# Patient Record
Sex: MALE | Race: WHITE | NOT HISPANIC OR LATINO
[De-identification: names, ages, dates, MRNs, and addresses within clinical notes are randomized per-mention and may not be internally consistent; named-entity substitution may affect disease eponyms.]

---

## 2022-10-10 PROBLEM — Z00.00 ENCOUNTER FOR PREVENTIVE HEALTH EXAMINATION: Status: ACTIVE | Noted: 2022-10-10

## 2022-10-18 ENCOUNTER — NON-APPOINTMENT (OUTPATIENT)
Age: 67
End: 2022-10-18

## 2022-10-18 ENCOUNTER — APPOINTMENT (OUTPATIENT)
Dept: NEPHROLOGY | Facility: CLINIC | Age: 67
End: 2022-10-18

## 2022-10-18 VITALS
DIASTOLIC BLOOD PRESSURE: 72 MMHG | HEART RATE: 80 BPM | HEIGHT: 73 IN | WEIGHT: 158 LBS | SYSTOLIC BLOOD PRESSURE: 114 MMHG | BODY MASS INDEX: 20.94 KG/M2

## 2022-10-18 DIAGNOSIS — Z86.711 PERSONAL HISTORY OF PULMONARY EMBOLISM: ICD-10-CM

## 2022-10-18 DIAGNOSIS — Z86.718 PERSONAL HISTORY OF OTHER VENOUS THROMBOSIS AND EMBOLISM: ICD-10-CM

## 2022-10-18 DIAGNOSIS — E78.00 PURE HYPERCHOLESTEROLEMIA, UNSPECIFIED: ICD-10-CM

## 2022-10-18 DIAGNOSIS — Z78.9 OTHER SPECIFIED HEALTH STATUS: ICD-10-CM

## 2022-10-18 PROCEDURE — 99205 OFFICE O/P NEW HI 60 MIN: CPT

## 2022-10-18 NOTE — REVIEW OF SYSTEMS
[Feeling Poorly] : feeling poorly [Recent Weight Loss (___ Lbs)] : recent [unfilled] ~Ulb weight loss [Negative] : Heme/Lymph [FreeTextEntry2] : 20 lbs - was 40 at 1 point in hosp

## 2022-10-18 NOTE — HISTORY OF PRESENT ILLNESS
[FreeTextEntry1] : 67-year-old man previously healthy, accompanied by his devoted wife, referred because of acute kidney injury and what may now be CKD 4.  He developed pemphigus vulgaris, and was treated with high-dose steroids and Rituxan for a severe flareup.  He developed pneumocystis pneumonia and was hospitalized in [place] at [facility] for 2 months from March to May.  Prednisone began at 40 mg for the first 2 weeks and then was tapered gradually -he is off it now, but will be starting Rituxan tomorrow.  His creatinine and GFR were perfectly normal in late 2021 and early 2022.  Creatinine was 0.8-0.9 with a GFR of at least 80.  He was nearly moribund during parts of his hospitalization and barely survived from what his wife describes.  He currently has a creatinine of 2.38, BUN 44, GFR 27, K4.5, CO2 22.  He also was severely anemic with a hemoglobin in the 6-7 range, and received 3 units of blood.  His hemoglobin came up to the range of 10-11 with a TF sat of 28% in July.  He received Bactrim for a lengthy period of time but not in the last 5 months.  So this elevation of creatinine is clearly not false due to tubular secretion of creatinine by Bactrim.  He admits to fatigue and coldness, which probably are a reflection of his anemia.

## 2022-10-18 NOTE — ASSESSMENT
[FreeTextEntry1] : 67-year-old man with pemphigus vulgaris, complicated by pneumocystis pneumonia and a 2-month hospitalization from March to May.  I believe that the deterioration in renal function was largely a form of JOEY related to the extreme illness, probable hypotension, severe anemia, and overwhelming infection.  The question now is whether there is reversibility.  I have ordered an ultrasound to assess kidney size, echogenicity, and rule out obstructive uropathy.  I ordered labs to include CMP, Cystatin C, CBC, PTH, phosphorus, urinalysis, U ACR, iron/TIBC C, ANCA, and screening for light chains.  There was no evidence of myeloma from his July blood work.  His dermatologic care will be handled by the [facility], Dr Wood et al. if his CO2 is below 23, I may start sodium bicarb and it to minimize the effects of metabolic acidosis.  If his hemoglobin is below 10.0, I anticipate starting erythropoietin in some form, as Retacrit or Procrit.  Although pemphigus is an autoimmune condition, I do not see any relationship directly and would not consider renal biopsy unless there is substantial proteinuria that is unexplained.  Time spent 55 minutes

## 2022-10-24 DIAGNOSIS — D50.9 IRON DEFICIENCY ANEMIA, UNSPECIFIED: ICD-10-CM

## 2022-12-05 ENCOUNTER — APPOINTMENT (OUTPATIENT)
Dept: NEPHROLOGY | Facility: CLINIC | Age: 67
End: 2022-12-05

## 2022-12-05 VITALS
HEIGHT: 73 IN | HEART RATE: 72 BPM | BODY MASS INDEX: 21.2 KG/M2 | SYSTOLIC BLOOD PRESSURE: 122 MMHG | WEIGHT: 160 LBS | DIASTOLIC BLOOD PRESSURE: 73 MMHG

## 2022-12-05 DIAGNOSIS — N18.4 CHRONIC KIDNEY DISEASE, STAGE 4 (SEVERE): ICD-10-CM

## 2022-12-05 PROCEDURE — 99214 OFFICE O/P EST MOD 30 MIN: CPT

## 2022-12-05 NOTE — ASSESSMENT
[FreeTextEntry1] : 68 yo M with good BP, +/- stable CKD, improved pemphigus control -  labs in 3-4 mos, then vis here

## 2022-12-05 NOTE — HISTORY OF PRESENT ILLNESS
[FreeTextEntry1] : 66 yo M with hx JOEY, CKD3-4, pemphigus managed by Dr Misty Mancia at [facility] - renal function stable - creat up from 2.07 to 2.28 but was on bactrim , which raises creatine artifactually by blocking tubular secretion of creat.  Pred down to 5 mg, done with Rituxan for now - appetite better, swalowing better, hgb up to 10.7 (saw Dr Baugh today - TF sat up to 22, ferritin pending.  GI w/u by Dr Niño was ok

## 2022-12-05 NOTE — CONSULT LETTER
[Dear  ___] : Dear  [unfilled], [Consult Letter:] : I had the pleasure of evaluating your patient, [unfilled]. [Please see my note below.] : Please see my note below. [Consult Closing:] : Thank you very much for allowing me to participate in the care of this patient.  If you have any questions, please do not hesitate to contact me. [Sincerely,] : Sincerely, [FreeTextEntry2] : Dr Rolo Muñoz [FreeTextEntry3] : Sincerely, \par \par Kwesi Huizar MD, FACP\par  [DrMusa  ___] : Dr. REN [DrMusa ___] : Dr. REN

## 2023-03-06 ENCOUNTER — APPOINTMENT (OUTPATIENT)
Dept: NEPHROLOGY | Facility: CLINIC | Age: 68
End: 2023-03-06
Payer: MEDICARE

## 2023-03-06 VITALS
HEIGHT: 73 IN | BODY MASS INDEX: 21.34 KG/M2 | DIASTOLIC BLOOD PRESSURE: 83 MMHG | HEART RATE: 76 BPM | SYSTOLIC BLOOD PRESSURE: 128 MMHG | WEIGHT: 161 LBS

## 2023-03-06 PROCEDURE — 99214 OFFICE O/P EST MOD 30 MIN: CPT

## 2023-03-06 NOTE — HISTORY OF PRESENT ILLNESS
[FreeTextEntry1] : 67-year-old man with a history of JOEY, CKD 3-4, pemphigus managed by Dr. Misty Mancia at [facility].  His renal function is stable, with a creatinine of 2.18, right in the middle of his usual comfort zone of 2.1-2.3.  The 2.3 occurred when he was on Bactrim, which blocks tubular secretion of creatinine and falsely raised serum creatinine.  His last Rituxan was 3 months ago.  His prednisone is down to 5 mg every other day and will be tapered off soon hopefully.  It is probably responsible for his BUN being somewhat elevated at 44.  He has a normal urine microalbumin.  His GFR is 32 by creatinine, but 45 by cys statin C.  The best news overall is the fact that his sense of wellbeing has returned beautifully, partly because his hemoglobin is up to 11.7 thanks to Dr. Baugh.   He has now been able to return to the active lifestyle he enjoyed before, and [facility] [employer] [facility] [place].  He and his wife have planned trips each of the next several months, [place] in May,, [place] in July.

## 2023-03-06 NOTE — CONSULT LETTER
[Dear  ___] : Dear  [unfilled], [Consult Letter:] : I had the pleasure of evaluating your patient, [unfilled]. [Please see my note below.] : Please see my note below. [Consult Closing:] : Thank you very much for allowing me to participate in the care of this patient.  If you have any questions, please do not hesitate to contact me. [Sincerely,] : Sincerely, [FreeTextEntry2] : Dr. Tiot Baugh -hematology [FreeTextEntry3] : Sincerely, \par \par Kwesi Huizar MD, FACP\par  [DrMusa  ___] : Dr. REN

## 2023-03-06 NOTE — ASSESSMENT
[FreeTextEntry1] : 67-year-old man with stable CKD 3B, now tapering off prednisone, with pemphigus well-controlled.  Hemoglobin is up to 11.9, no microalbuminuria, minimal secondary hyperparathyroidism, and GFR higher than we would have thought based on creatinine.  The best news is his return of wellbeing and quality of life.  He will return in August after he and his wife to several trips.  I have ordered labs to include BMP, Cystatin C, PTH, H&H.

## 2023-08-16 ENCOUNTER — APPOINTMENT (OUTPATIENT)
Dept: NEPHROLOGY | Facility: CLINIC | Age: 68
End: 2023-08-16
Payer: MEDICARE

## 2023-08-16 VITALS
BODY MASS INDEX: 21.2 KG/M2 | DIASTOLIC BLOOD PRESSURE: 74 MMHG | WEIGHT: 160 LBS | HEIGHT: 73 IN | SYSTOLIC BLOOD PRESSURE: 132 MMHG

## 2023-08-16 PROCEDURE — 99214 OFFICE O/P EST MOD 30 MIN: CPT

## 2023-08-16 NOTE — HISTORY OF PRESENT ILLNESS
[FreeTextEntry1] : 68-year-old man with a history of JOEY, CKD stage IIIb, pemphigus managed by Dr. Misty Mancia at the [facility].  His PCP is Dr. Rolo Muñoz.  Renal function has been stable with a creatinine of 2.2, but his latest is 1.91 with a BUN of 29, GFR of 37 by creatinine, but 59 by Cystatin C.  So his true GFR is probably at least 45-50.  His K is 4.6 with a CO2 of 25.  Hemoglobin is 12.1, PTH is normal at 48.  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  He feels generally well and remains optimistic with a positive demeanor.

## 2023-08-16 NOTE — CONSULT LETTER
[Dear  ___] : Dear  [unfilled], [Consult Letter:] : I had the pleasure of evaluating your patient, [unfilled]. [Please see my note below.] : Please see my note below. [Consult Closing:] : Thank you very much for allowing me to participate in the care of this patient.  If you have any questions, please do not hesitate to contact me. [Sincerely,] : Sincerely, [FreeTextEntry2] : Dr Rolo Muñoz [FreeTextEntry3] : Sincerely,   Kwesi Huizar MD, FACP [DrMusa  ___] : Dr. REN

## 2023-08-16 NOTE — ASSESSMENT
[FreeTextEntry1] : 68-year-old man with stable, slightly improved CKD -best news of all is that his creatinine dropped to 1.9 and his GFR is higher than his creatinine would suggest.  He is about to undergo hip replacement and will return here in 4 to 5 months, preceded by labs to include BMP, Cystatin C, PTH.

## 2023-12-13 ENCOUNTER — APPOINTMENT (OUTPATIENT)
Dept: NEPHROLOGY | Facility: CLINIC | Age: 68
End: 2023-12-13
Payer: MEDICARE

## 2023-12-13 VITALS
WEIGHT: 160 LBS | SYSTOLIC BLOOD PRESSURE: 133 MMHG | DIASTOLIC BLOOD PRESSURE: 75 MMHG | BODY MASS INDEX: 21.2 KG/M2 | HEIGHT: 73 IN

## 2023-12-13 PROCEDURE — 99214 OFFICE O/P EST MOD 30 MIN: CPT

## 2023-12-13 NOTE — HISTORY OF PRESENT ILLNESS
[FreeTextEntry1] : 68-year-old man referred by Dr. Rolo Muñoz because of renal disease.  He has pemphigus managed by Dr. Misty Mancia at [facility].  His renal function has actually improved, with creatinine falling from 2.2 down to 1.9 and now 1.62.  His GFR is 45 by creatinine but only 33 by Cystatin C.  His K is 4.3, CO2 25, PTH is normal at 28.  He just completed his last dose of rituximab, well-tolerated.  He feels generally well.  He exercises very faithfully virtually every day, walking 4 miles and weight lifting.  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

## 2023-12-13 NOTE — ASSESSMENT
[FreeTextEntry1] : 68-year-old man with CKD which has progressively improved in parallel with his rituximab treatments at [place].  It is more than tempting to assume that Rituxan is treating an immunologic aspect of his disease.  His BP remains good.  His exercise habits are superb.  He will return in 4 months, preceded by labs to include BMP, Cystatin C, urinalysis, UACR.

## 2024-04-15 ENCOUNTER — APPOINTMENT (OUTPATIENT)
Dept: NEPHROLOGY | Facility: CLINIC | Age: 69
End: 2024-04-15
Payer: MEDICARE

## 2024-04-15 VITALS
BODY MASS INDEX: 21.2 KG/M2 | HEIGHT: 73 IN | DIASTOLIC BLOOD PRESSURE: 74 MMHG | SYSTOLIC BLOOD PRESSURE: 122 MMHG | WEIGHT: 160 LBS

## 2024-04-15 DIAGNOSIS — N18.32 CHRONIC KIDNEY DISEASE, STAGE 3B: ICD-10-CM

## 2024-04-15 DIAGNOSIS — N18.9 CHRONIC KIDNEY DISEASE, UNSPECIFIED: ICD-10-CM

## 2024-04-15 DIAGNOSIS — N17.9 ACUTE KIDNEY FAILURE, UNSPECIFIED: ICD-10-CM

## 2024-04-15 DIAGNOSIS — D63.1 CHRONIC KIDNEY DISEASE, UNSPECIFIED: ICD-10-CM

## 2024-04-15 DIAGNOSIS — E87.20 ACIDOSIS, UNSPECIFIED: ICD-10-CM

## 2024-04-15 PROCEDURE — 99214 OFFICE O/P EST MOD 30 MIN: CPT

## 2024-04-15 PROCEDURE — G2211 COMPLEX E/M VISIT ADD ON: CPT

## 2024-04-15 NOTE — ASSESSMENT
[FreeTextEntry1] : 68-year-old man with stage IIIb CKD, which worsened slightly, probably as result of poor hydration and use of NSAIDs.  We discussed the need to hydrate much better and stop using Aleve and ibuprofen.  He promises to do both.  He will return in 4 months, preceded by labs to include UACR, BMP, Cystatin C, PTH.

## 2024-04-15 NOTE — HISTORY OF PRESENT ILLNESS
[FreeTextEntry1] : 68-year-old man referred by Dr. Rolo Muñoz because of renal disease.  His pemphigus has been managed at [facility] for years.  He receives rituximab, and I have reassured him that it is not nephrotoxic.  Renal function improved with creatinine falling from 2.2 down to a low of 1.6, but now back to 2.0 with a GFR of 35, BUN 33, normal urinalysis, K4.7, CO2 25, no microalbuminuria, and Cystatin C is pending.  In the past, his GFR has been lower using Cystatin C than serum creatinine.  It appears there are 2 possible reasons for the rising creatinine recently, 1 being lack of fluid intake, particularly when he hikes, and he has been using NSAIDs including Aleve and ibuprofen for hip discomfort.  He says he can get by without them and will stop.

## 2024-08-14 ENCOUNTER — APPOINTMENT (OUTPATIENT)
Dept: NEPHROLOGY | Facility: CLINIC | Age: 69
End: 2024-08-14
Payer: MEDICARE

## 2024-08-14 VITALS
HEIGHT: 73 IN | SYSTOLIC BLOOD PRESSURE: 118 MMHG | DIASTOLIC BLOOD PRESSURE: 75 MMHG | BODY MASS INDEX: 21.2 KG/M2 | WEIGHT: 160 LBS

## 2024-08-14 DIAGNOSIS — E87.20 ACIDOSIS, UNSPECIFIED: ICD-10-CM

## 2024-08-14 DIAGNOSIS — N18.32 CHRONIC KIDNEY DISEASE, STAGE 3B: ICD-10-CM

## 2024-08-14 DIAGNOSIS — E87.5 HYPERKALEMIA: ICD-10-CM

## 2024-08-14 DIAGNOSIS — N18.9 CHRONIC KIDNEY DISEASE, UNSPECIFIED: ICD-10-CM

## 2024-08-14 DIAGNOSIS — D63.1 CHRONIC KIDNEY DISEASE, UNSPECIFIED: ICD-10-CM

## 2024-08-14 DIAGNOSIS — N17.9 ACUTE KIDNEY FAILURE, UNSPECIFIED: ICD-10-CM

## 2024-08-14 PROCEDURE — G2211 COMPLEX E/M VISIT ADD ON: CPT

## 2024-08-14 PROCEDURE — 99214 OFFICE O/P EST MOD 30 MIN: CPT

## 2024-08-14 NOTE — HISTORY OF PRESENT ILLNESS
[FreeTextEntry1] : 69-year-old man referred by Dr. Rolo Muñoz because of renal disease.  He has pemphigus that has been managed at [facility] for years.  He receives rituximab.  Renal function improved from a peak creatinine of 2.2 down to a low of 1.6 and then seem to plateau at 2.0, with no microalbuminuria and a BUN typically in the 30s.  His fluid intake is probably less than ideal.  He also has used NSAIDs in the past because of hip pain.  He agreed to stop them when I saw him back in April.  His creatinine 6 weeks ago was back down to 1.66, but BUN was still elevated at 39, GFR 44 by creatinine but only 36 by Cystatin C, K was higher than usual at 5.4, CO2 22, urine microalbumin normal, PTH normal at 68.  He feels well generally, and was just walking 8 to 10 miles a day in [place] recently.  He is about to undergo left hip replacement on August 23 at [facility] in [facility].  He previously had the right hip replaced last year and did well.  He has avoided NSAIDs totally.

## 2024-08-14 NOTE — ASSESSMENT
[FreeTextEntry1] : 69-year-old man who is renal function is stable.  His last dose of rituximab was in December.  He is treated at [facility] by Dr. Dos Santos.  His BP remains normal.  He will return in 4 months, preceded by labs.

## 2024-12-18 ENCOUNTER — APPOINTMENT (OUTPATIENT)
Dept: NEPHROLOGY | Facility: CLINIC | Age: 69
End: 2024-12-18
Payer: MEDICARE

## 2024-12-18 VITALS
SYSTOLIC BLOOD PRESSURE: 112 MMHG | DIASTOLIC BLOOD PRESSURE: 74 MMHG | HEIGHT: 73 IN | WEIGHT: 160 LBS | BODY MASS INDEX: 21.2 KG/M2

## 2024-12-18 DIAGNOSIS — D63.1 CHRONIC KIDNEY DISEASE, UNSPECIFIED: ICD-10-CM

## 2024-12-18 DIAGNOSIS — E87.20 ACIDOSIS, UNSPECIFIED: ICD-10-CM

## 2024-12-18 DIAGNOSIS — N18.32 CHRONIC KIDNEY DISEASE, STAGE 3B: ICD-10-CM

## 2024-12-18 DIAGNOSIS — N17.9 ACUTE KIDNEY FAILURE, UNSPECIFIED: ICD-10-CM

## 2024-12-18 DIAGNOSIS — E87.5 HYPERKALEMIA: ICD-10-CM

## 2024-12-18 DIAGNOSIS — N18.9 CHRONIC KIDNEY DISEASE, UNSPECIFIED: ICD-10-CM

## 2024-12-18 PROCEDURE — G2211 COMPLEX E/M VISIT ADD ON: CPT

## 2024-12-18 PROCEDURE — 99214 OFFICE O/P EST MOD 30 MIN: CPT

## 2025-04-23 ENCOUNTER — APPOINTMENT (OUTPATIENT)
Dept: NEPHROLOGY | Facility: CLINIC | Age: 70
End: 2025-04-23
Payer: MEDICARE

## 2025-04-23 VITALS
HEIGHT: 73 IN | WEIGHT: 160 LBS | SYSTOLIC BLOOD PRESSURE: 104 MMHG | DIASTOLIC BLOOD PRESSURE: 66 MMHG | BODY MASS INDEX: 21.2 KG/M2

## 2025-04-23 DIAGNOSIS — N18.32 CHRONIC KIDNEY DISEASE, STAGE 3B: ICD-10-CM

## 2025-04-23 DIAGNOSIS — D63.1 CHRONIC KIDNEY DISEASE, UNSPECIFIED: ICD-10-CM

## 2025-04-23 DIAGNOSIS — N17.9 ACUTE KIDNEY FAILURE, UNSPECIFIED: ICD-10-CM

## 2025-04-23 DIAGNOSIS — N18.9 CHRONIC KIDNEY DISEASE, UNSPECIFIED: ICD-10-CM

## 2025-04-23 DIAGNOSIS — E87.20 ACIDOSIS, UNSPECIFIED: ICD-10-CM

## 2025-04-23 PROCEDURE — G2211 COMPLEX E/M VISIT ADD ON: CPT

## 2025-04-23 PROCEDURE — 99214 OFFICE O/P EST MOD 30 MIN: CPT

## 2025-05-02 ENCOUNTER — APPOINTMENT (OUTPATIENT)
Dept: PULMONOLOGY | Facility: CLINIC | Age: 70
End: 2025-05-02
Payer: MEDICARE

## 2025-05-02 ENCOUNTER — NON-APPOINTMENT (OUTPATIENT)
Age: 70
End: 2025-05-02

## 2025-05-02 VITALS
TEMPERATURE: 97.9 F | HEIGHT: 73 IN | DIASTOLIC BLOOD PRESSURE: 74 MMHG | HEART RATE: 55 BPM | BODY MASS INDEX: 22.53 KG/M2 | OXYGEN SATURATION: 100 % | WEIGHT: 170 LBS | SYSTOLIC BLOOD PRESSURE: 116 MMHG

## 2025-05-02 DIAGNOSIS — J18.9 PNEUMONIA, UNSPECIFIED ORGANISM: ICD-10-CM

## 2025-05-02 PROCEDURE — 94060 EVALUATION OF WHEEZING: CPT

## 2025-05-02 PROCEDURE — 99204 OFFICE O/P NEW MOD 45 MIN: CPT | Mod: 25

## 2025-05-02 PROCEDURE — 71046 X-RAY EXAM CHEST 2 VIEWS: CPT

## 2025-05-07 LAB
DEPRECATED KAPPA LC FREE/LAMBDA SER: 0.47 RATIO
IGA SER QL IEP: 229 MG/DL
IGG SER QL IEP: 893 MG/DL
IGM SER QL IEP: 51 MG/DL
KAPPA LC CSF-MCNC: 3.86 MG/DL
KAPPA LC SERPL-MCNC: 1.83 MG/DL

## 2025-05-10 LAB
DEPRECATED S PNEUM 1 IGG SER-MCNC: 0.3 MCG/ML
DEPRECATED S PNEUM12 AB SER-ACNC: 0.3 MCG/ML
DEPRECATED S PNEUM14 AB SER-ACNC: 13.9 MCG/ML
DEPRECATED S PNEUM17 IGG SER IA-MCNC: 0.5 MCG/ML
DEPRECATED S PNEUM18 IGG SER IA-MCNC: 0.6 MCG/ML
DEPRECATED S PNEUM19 IGG SER-MCNC: 0.6 MCG/ML
DEPRECATED S PNEUM19 IGG SER-MCNC: 0.7 MCG/ML
DEPRECATED S PNEUM2 IGG SER-MCNC: 0.3 MCG/ML
DEPRECATED S PNEUM20 IGG SER-MCNC: 1.4 MCG/ML
DEPRECATED S PNEUM22 IGG SER-MCNC: 0.2 MCG/ML
DEPRECATED S PNEUM23 AB SER-ACNC: 0.2 MCG/ML
DEPRECATED S PNEUM3 AB SER-ACNC: 0.1 MCG/ML
DEPRECATED S PNEUM34 IGG SER-MCNC: 0.7 MCG/ML
DEPRECATED S PNEUM4 AB SER-ACNC: <0.1 MCG/ML
DEPRECATED S PNEUM5 IGG SER-MCNC: <0.1 MCG/ML
DEPRECATED S PNEUM6 IGG SER-MCNC: 0.2 MCG/ML
DEPRECATED S PNEUM7 IGG SER-ACNC: 0.2 MCG/ML
DEPRECATED S PNEUM8 AB SER-ACNC: 6.9 MCG/ML
DEPRECATED S PNEUM9 AB SER-ACNC: 0.1 MCG/ML
DEPRECATED S PNEUM9 IGG SER-MCNC: 0.1 MCG/ML
IMMUNOLOGIST REVIEW: NORMAL
STREPTOCOCCUS PNEUMONIAE SEROTYPE 11A: 0.1 MCG/ML
STREPTOCOCCUS PNEUMONIAE SEROTYPE 15B: 1.2 MCG/ML
STREPTOCOCCUS PNEUMONIAE SEROTYPE 33F: 0.5 MCG/ML

## 2025-05-30 ENCOUNTER — APPOINTMENT (OUTPATIENT)
Dept: CT IMAGING | Facility: CLINIC | Age: 70
End: 2025-05-30

## 2025-05-30 PROCEDURE — 71250 CT THORAX DX C-: CPT

## 2025-06-05 LAB
DEPRECATED S PNEUM 1 IGG SER-MCNC: 0.4 MCG/ML
DEPRECATED S PNEUM12 AB SER-ACNC: 0.2 MCG/ML
DEPRECATED S PNEUM14 AB SER-ACNC: 62.3 MCG/ML
DEPRECATED S PNEUM17 IGG SER IA-MCNC: 0.6 MCG/ML
DEPRECATED S PNEUM18 IGG SER IA-MCNC: 1.8 MCG/ML
DEPRECATED S PNEUM19 IGG SER-MCNC: 0.5 MCG/ML
DEPRECATED S PNEUM19 IGG SER-MCNC: 0.7 MCG/ML
DEPRECATED S PNEUM2 IGG SER-MCNC: 0.4 MCG/ML
DEPRECATED S PNEUM20 IGG SER-MCNC: 2.1 MCG/ML
DEPRECATED S PNEUM22 IGG SER-MCNC: 0.2 MCG/ML
DEPRECATED S PNEUM23 AB SER-ACNC: 0.2 MCG/ML
DEPRECATED S PNEUM3 AB SER-ACNC: 0.1 MCG/ML
DEPRECATED S PNEUM34 IGG SER-MCNC: 1.3 MCG/ML
DEPRECATED S PNEUM4 AB SER-ACNC: <0.1 MCG/ML
DEPRECATED S PNEUM5 IGG SER-MCNC: <0.1 MCG/ML
DEPRECATED S PNEUM6 IGG SER-MCNC: 0.4 MCG/ML
DEPRECATED S PNEUM7 IGG SER-ACNC: 0.2 MCG/ML
DEPRECATED S PNEUM8 AB SER-ACNC: 10.3 MCG/ML
DEPRECATED S PNEUM9 AB SER-ACNC: 0.1 MCG/ML
DEPRECATED S PNEUM9 IGG SER-MCNC: 0.1 MCG/ML
IMMUNOLOGIST REVIEW: NORMAL
STREPTOCOCCUS PNEUMONIAE SEROTYPE 11A: 0.2 MCG/ML
STREPTOCOCCUS PNEUMONIAE SEROTYPE 15B: 3.2 MCG/ML
STREPTOCOCCUS PNEUMONIAE SEROTYPE 33F: 0.4 MCG/ML

## 2025-08-21 ENCOUNTER — APPOINTMENT (OUTPATIENT)
Dept: NEPHROLOGY | Facility: CLINIC | Age: 70
End: 2025-08-21
Payer: MEDICARE

## 2025-08-21 VITALS
HEIGHT: 73 IN | DIASTOLIC BLOOD PRESSURE: 77 MMHG | SYSTOLIC BLOOD PRESSURE: 116 MMHG | BODY MASS INDEX: 22.53 KG/M2 | WEIGHT: 170 LBS

## 2025-08-21 DIAGNOSIS — N18.32 CHRONIC KIDNEY DISEASE, STAGE 3B: ICD-10-CM

## 2025-08-21 DIAGNOSIS — N25.81 SECONDARY HYPERPARATHYROIDISM OF RENAL ORIGIN: ICD-10-CM

## 2025-08-21 DIAGNOSIS — E87.20 ACIDOSIS, UNSPECIFIED: ICD-10-CM

## 2025-08-21 DIAGNOSIS — D63.1 CHRONIC KIDNEY DISEASE, UNSPECIFIED: ICD-10-CM

## 2025-08-21 DIAGNOSIS — N18.9 CHRONIC KIDNEY DISEASE, UNSPECIFIED: ICD-10-CM

## 2025-08-21 DIAGNOSIS — N17.9 ACUTE KIDNEY FAILURE, UNSPECIFIED: ICD-10-CM

## 2025-08-21 PROCEDURE — G2211 COMPLEX E/M VISIT ADD ON: CPT

## 2025-08-21 PROCEDURE — 99214 OFFICE O/P EST MOD 30 MIN: CPT

## 2025-09-15 ENCOUNTER — APPOINTMENT (OUTPATIENT)
Dept: PULMONOLOGY | Facility: CLINIC | Age: 70
End: 2025-09-15
Payer: MEDICARE

## 2025-09-15 PROCEDURE — 36415 COLL VENOUS BLD VENIPUNCTURE: CPT

## 2025-09-22 LAB
CD19 CELLS # BLD: 35 CELLS/UL
CD19 CELLS NFR BLD: 2.4
CD19+CD27+ CELLS # BLD: 1 CELLS/UL
CD19+CD27+IGD+IGM+ CELLS # BLD: 1 CELLS/UL
CD19+CD27+IGD-IGM- CELLS # BLD: 1 CELLS/UL
CD19+CD38+IGM+ CELLS # BLD: 10 CELLS/UL
CD19+CD38+IGM- CELLS # BLD: 0 CELLS/UL
CD20 CELLS # BLD: 34 CELLS/UL
CD20 CELLS/CELLS.CD19 NFR BLD: 96.7
CD21LOW38- CELLS # BLD: 1 CELLS/UL
CD21LOW38- CELLS/CELLS.CD19 NFR BLD: 1.7
CD27 CELLS/CELLS.CD19 NFR BLD: 4
CD27+IGD+IGM+/CD19 CELLS NFR BLD: 1.6
CD27+IGD-IGM-/CD19 CELLS NFR BLD: 2.1
CD38+IGM+ CELLS/CELLS.CD19 NFR BLD: 29
CD38+IGM- CELLS/CELLS.CD19 NFR BLD: 1.3
DEPRECATED S PNEUM 1 IGG SER-MCNC: 0.5 MCG/ML
DEPRECATED S PNEUM12 AB SER-ACNC: 0.1 MCG/ML
DEPRECATED S PNEUM14 AB SER-ACNC: 83.5 MCG/ML
DEPRECATED S PNEUM17 IGG SER IA-MCNC: 0.5 MCG/ML
DEPRECATED S PNEUM18 IGG SER IA-MCNC: 2.3 MCG/ML
DEPRECATED S PNEUM19 IGG SER-MCNC: 0.3 MCG/ML
DEPRECATED S PNEUM19 IGG SER-MCNC: 1 MCG/ML
DEPRECATED S PNEUM2 IGG SER-MCNC: 0.4 MCG/ML
DEPRECATED S PNEUM20 IGG SER-MCNC: 2.3 MCG/ML
DEPRECATED S PNEUM22 IGG SER-MCNC: 0.2 MCG/ML
DEPRECATED S PNEUM23 AB SER-ACNC: 0.2 MCG/ML
DEPRECATED S PNEUM3 AB SER-ACNC: 0.1 MCG/ML
DEPRECATED S PNEUM34 IGG SER-MCNC: 1.6 MCG/ML
DEPRECATED S PNEUM4 AB SER-ACNC: 0.1 MCG/ML
DEPRECATED S PNEUM5 IGG SER-MCNC: <0.1 MCG/ML
DEPRECATED S PNEUM6 IGG SER-MCNC: 0.6 MCG/ML
DEPRECATED S PNEUM7 IGG SER-ACNC: 0.3 MCG/ML
DEPRECATED S PNEUM8 AB SER-ACNC: 9.7 MCG/ML
DEPRECATED S PNEUM9 AB SER-ACNC: 0.1 MCG/ML
DEPRECATED S PNEUM9 IGG SER-MCNC: 0.1 MCG/ML
IMMUNOLOGIST REVIEW: NORMAL
STREPTOCOCCUS PNEUMONIAE SEROTYPE 11A: 0.3 MCG/ML
STREPTOCOCCUS PNEUMONIAE SEROTYPE 15B: 5 MCG/ML
STREPTOCOCCUS PNEUMONIAE SEROTYPE 33F: 0.4 MCG/ML